# Patient Record
Sex: FEMALE | Race: WHITE | URBAN - METROPOLITAN AREA
[De-identification: names, ages, dates, MRNs, and addresses within clinical notes are randomized per-mention and may not be internally consistent; named-entity substitution may affect disease eponyms.]

---

## 2017-01-04 ENCOUNTER — TELEPHONE (OUTPATIENT)
Dept: OBGYN | Facility: CLINIC | Age: 67
End: 2017-01-04

## 2017-01-04 NOTE — TELEPHONE ENCOUNTER
Panel Management Review      Patient has the following on her problem list: None      Composite cancer screening  Chart review shows that this patient is due/due soon for the following Mammogram  Summary:    Patient is due/failing the following:   MAMMOGRAM    Action needed:   mammogram    Type of outreach:    Sent letter.    Questions for provider review:    None                                                                                                                                    Katrin Hooper

## 2017-01-04 NOTE — Clinical Note
[facility]  [address] [place] [address]  [phone]    January 4, 2017    Jeffrey Newman  [address] [place] [address]          Dear Jeffrey,    This letter is to remind you that you are due for your mammogram.    Please call [phone] to schedule your appointment at your earliest convenience.     If you have transferred your care elsewhere, please contact our office and we would be happy to forward your records. If you have any financial concerns regarding this appointment, please call so that we can discuss this further.      Sincerely,    Caesar Malik MD/tk

## 2017-02-16 ENCOUNTER — MEDICAL CORRESPONDENCE (OUTPATIENT)
Dept: HEALTH INFORMATION MANAGEMENT | Facility: CLINIC | Age: 67
End: 2017-02-16

## 2017-02-21 ENCOUNTER — OFFICE VISIT (OUTPATIENT)
Dept: OBGYN | Facility: CLINIC | Age: 67
End: 2017-02-21
Payer: MEDICARE

## 2017-02-21 VITALS
SYSTOLIC BLOOD PRESSURE: 117 MMHG | BODY MASS INDEX: 35.05 KG/M2 | DIASTOLIC BLOOD PRESSURE: 77 MMHG | WEIGHT: 197.8 LBS | HEART RATE: 61 BPM | HEIGHT: 63 IN

## 2017-02-21 DIAGNOSIS — Z00.00 ROUTINE GENERAL MEDICAL EXAMINATION AT A HEALTH CARE FACILITY: Primary | ICD-10-CM

## 2017-02-21 DIAGNOSIS — G47.00 INSOMNIA, UNSPECIFIED TYPE: ICD-10-CM

## 2017-02-21 DIAGNOSIS — Z78.0 MENOPAUSE: ICD-10-CM

## 2017-02-21 DIAGNOSIS — Z98.890 S/P LEEP OF CERVIX: ICD-10-CM

## 2017-02-21 DIAGNOSIS — E01.0 THYROMEGALY: ICD-10-CM

## 2017-02-21 DIAGNOSIS — M85.80 OSTEOPENIA: ICD-10-CM

## 2017-02-21 LAB
CALCIUM SERPL-MCNC: 8.8 MG/DL (ref 8.5–10.1)
CHOLEST SERPL-MCNC: 214 MG/DL
DEPRECATED CALCIDIOL+CALCIFEROL SERPL-MC: 26 UG/L (ref 20–75)
GLUCOSE SERPL-MCNC: 93 MG/DL (ref 70–99)
HDLC SERPL-MCNC: 61 MG/DL
LDLC SERPL CALC-MCNC: 131 MG/DL
NONHDLC SERPL-MCNC: 153 MG/DL
PTH-INTACT SERPL-MCNC: 114 PG/ML (ref 12–72)
T4 FREE SERPL-MCNC: 0.85 NG/DL (ref 0.76–1.46)
TRIGL SERPL-MCNC: 109 MG/DL
TSH SERPL DL<=0.005 MIU/L-ACNC: 4.47 MU/L (ref 0.4–4)

## 2017-02-21 PROCEDURE — G0101 CA SCREEN;PELVIC/BREAST EXAM: HCPCS | Performed by: OBSTETRICS & GYNECOLOGY

## 2017-02-21 PROCEDURE — 99397 PER PM REEVAL EST PAT 65+ YR: CPT | Performed by: OBSTETRICS & GYNECOLOGY

## 2017-02-21 PROCEDURE — 99213 OFFICE O/P EST LOW 20 MIN: CPT | Mod: 25 | Performed by: OBSTETRICS & GYNECOLOGY

## 2017-02-21 PROCEDURE — 82947 ASSAY GLUCOSE BLOOD QUANT: CPT | Performed by: OBSTETRICS & GYNECOLOGY

## 2017-02-21 PROCEDURE — 88175 CYTOPATH C/V AUTO FLUID REDO: CPT | Performed by: OBSTETRICS & GYNECOLOGY

## 2017-02-21 PROCEDURE — 84439 ASSAY OF FREE THYROXINE: CPT | Performed by: OBSTETRICS & GYNECOLOGY

## 2017-02-21 PROCEDURE — 82306 VITAMIN D 25 HYDROXY: CPT | Performed by: OBSTETRICS & GYNECOLOGY

## 2017-02-21 PROCEDURE — 84443 ASSAY THYROID STIM HORMONE: CPT | Performed by: OBSTETRICS & GYNECOLOGY

## 2017-02-21 PROCEDURE — 80061 LIPID PANEL: CPT | Performed by: OBSTETRICS & GYNECOLOGY

## 2017-02-21 PROCEDURE — 82310 ASSAY OF CALCIUM: CPT | Performed by: OBSTETRICS & GYNECOLOGY

## 2017-02-21 PROCEDURE — 87624 HPV HI-RISK TYP POOLED RSLT: CPT | Performed by: OBSTETRICS & GYNECOLOGY

## 2017-02-21 PROCEDURE — 36415 COLL VENOUS BLD VENIPUNCTURE: CPT | Performed by: OBSTETRICS & GYNECOLOGY

## 2017-02-21 PROCEDURE — 83970 ASSAY OF PARATHORMONE: CPT | Performed by: OBSTETRICS & GYNECOLOGY

## 2017-02-21 PROCEDURE — G0476 HPV COMBO ASSAY CA SCREEN: HCPCS | Performed by: OBSTETRICS & GYNECOLOGY

## 2017-02-21 RX ORDER — CITALOPRAM HYDROBROMIDE 20 MG/1
20 TABLET ORAL DAILY
Qty: 90 TABLET | Refills: 3 | Status: SHIPPED | OUTPATIENT
Start: 2017-02-21

## 2017-02-21 RX ORDER — TRAZODONE HYDROCHLORIDE 50 MG/1
50 TABLET, FILM COATED ORAL
Qty: 90 TABLET | Refills: 3 | Status: SHIPPED | OUTPATIENT
Start: 2017-02-21
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
## 2017-02-21 NOTE — MR AVS SNAPSHOT
After Visit Summary   2/21/2017    Jeffrey Newman    MRN: [id number]           Patient Information     Date Of Birth          1950        Visit Information        Provider Department      2/21/2017 9:30 AM Caesar Malik MD [facility]        Today's Diagnoses     Routine general medical examination at a health care facility    -  1    Osteopenia        Menopause        S/P LEEP of cervix        Insomnia, unspecified type        Thyromegaly          Care Instructions      Preventive Health Recommendations  Female Ages 65 +    Yearly exam:     See your health care provider every year in order to  o Review health changes.   o Discuss preventive care.    o Review your medicines if your doctor has prescribed any.      You no longer need a yearly Pap test unless you've had an abnormal Pap test in the past 10 years. If you have vaginal symptoms, such as bleeding or discharge, be sure to talk with your provider about a Pap test.      Every 1 to 2 years, have a mammogram.  If you are over 69, talk with your health care provider about whether or not you want to continue having screening mammograms.      Every 10 years, have a colonoscopy. Or, have a yearly FIT test (stool test). These exams will check for colon cancer.       Have a cholesterol test every 5 years, or more often if your doctor advises it.       Have a diabetes test (fasting glucose) every three years. If you are at risk for diabetes, you should have this test more often.       At age 65, have a bone density scan (DEXA) to check for osteoporosis (brittle bone disease).    Shots:    Get a flu shot each year.    Get a tetanus shot every 10 years.    Talk to your doctor about your pneumonia vaccines. There are now two you should receive - Pneumovax (PPSV 23) and Prevnar (PCV 13).    Talk to your doctor about the shingles vaccine.    Talk to your doctor about the hepatitis B vaccine.    Nutrition:     Eat at least 5 servings of  fruits and vegetables each day.      Eat whole-grain bread, whole-wheat pasta and brown rice instead of white grains and rice.      Talk to your provider about Calcium and Vitamin D.     Lifestyle    Exercise at least 150 minutes a week (30 minutes a day, 5 days a week). This will help you control your weight and prevent disease.      Limit alcohol to one drink per day.      No smoking.       Wear sunscreen to prevent skin cancer.       See your dentist twice a year for an exam and cleaning.      See your eye doctor every 1 to 2 years to screen for conditions such as glaucoma, macular degeneration, cataracts, etc         Follow-ups after your visit        Who to contact     If you have questions or need follow up information about today's clinic visit or your schedule please contact [facility] directly at [phone].  Normal or non-critical lab and imaging results will be communicated to you by MyChart, letter or phone within 4 business days after the clinic has received the results. If you do not hear from us within 7 days, please contact the clinic through [employer] or phone. If you have a critical or abnormal lab result, we will notify you by phone as soon as possible.  Submit refill requests through [employer] or call your pharmacy and they will forward the refill request to us. Please allow 3 business days for your refill to be completed.          Additional Information About Your Visit        [employer] Information     [employer] gives you secure access to your electronic health record. If you see a primary care provider, you can also send messages to your care team and make appointments. If you have questions, please call your primary care clinic.  If you do not have a primary care provider, please call [phone] and they will assist you.        Care EveryWhere ID     This is your Care EveryWhere ID. This could be used by other organizations to access your [place] medical records  [id number]       "  Your Vitals Were     Pulse Height BMI (Body Mass Index)             61 5' 2.75\" (1.594 m) 35.32 kg/m2          Blood Pressure from Last 3 Encounters:   02/21/17 117/77   12/19/16 118/71   12/09/15 100/60    Weight from Last 3 Encounters:   02/21/17 197 lb 12.8 oz (89.7 kg)   12/09/15 177 lb (80.3 kg)   11/18/15 182 lb (82.6 kg)              We Performed the Following     Calcium     Glucose     HPV High Risk Types DNA Cervical     Lipid Profile     Pap imaged thin layer diagnostic with HPV (select HPV order below)     Parathyroid Hormone Intact     TSH with free T4 reflex     Vitamin D Deficiency          Today's Medication Changes          These changes are accurate as of: 2/21/17 11:11 AM.  If you have any questions, ask your nurse or doctor.               Stop taking these medicines if you haven't already. Please contact your care team if you have questions.     doxycycline Monohydrate 100 MG Caps   Stopped by:  Caesar Malik MD                Where to get your medicines      These medications were sent to [facility] [address] [address]     Phone:  [phone]     citalopram 20 MG tablet    traZODone 50 MG tablet                Primary Care Provider Office Phone # Fax #    Rose Marie Corcoran [phone]       [facility]    [address] [place] [address]        Thank you!     Thank you for choosing [facility]  for your care. Our goal is always to provide you with excellent care. Hearing back from our patients is one way we can continue to improve our services. Please take a few minutes to complete the written survey that you may receive in the mail after your visit with us. Thank you!             Your Updated Medication List - Protect others around you: Learn how to safely use, store and throw away your medicines at www.disposemymeds.org.        "   This list is accurate as of: 2/21/17 11:11 AM.  Always use your most recent med list.                   Brand Name Dispense Instructions for use    citalopram 20 MG tablet    celeXA    90 tablet    Take 1 tablet (20 mg) by mouth daily       glucosamine chondroitinoitin complex Caps      Take  by mouth.       mometasone 0.1 % cream    ELOCON    45 g    Apply to AA BID x 1 week then PRN       multivitamin per tablet     100 tablet    Take 1 tablet by mouth daily.       traZODone 50 MG tablet    DESYREL    90 tablet    Take 1 tablet (50 mg) by mouth nightly as needed for sleep

## 2017-02-21 NOTE — NURSING NOTE
"Initial /77 (BP Location: Right arm, Patient Position: Chair, Cuff Size: Adult Regular)  Pulse 61  Ht 5' 2.75\" (1.594 m)  Wt 197 lb 12.8 oz (89.7 kg)  BMI 35.32 kg/m2 Estimated body mass index is 35.32 kg/(m^2) as calculated from the following:    Height as of this encounter: 5' 2.75\" (1.594 m).    Weight as of this encounter: 197 lb 12.8 oz (89.7 kg). .      "

## 2017-02-22 ASSESSMENT — PATIENT HEALTH QUESTIONNAIRE - PHQ9: SUM OF ALL RESPONSES TO PHQ QUESTIONS 1-9: 0

## 2017-02-22 NOTE — PROGRESS NOTES
Jeffrey   i forwarded your parathyroid , calcium and Vitamin D levels to your back specialist  Caesar Malik

## 2017-02-24 LAB
COPATH REPORT: ABNORMAL
PAP: ABNORMAL

## 2017-02-27 LAB
FINAL DIAGNOSIS: ABNORMAL
HPV HR 12 DNA CVX QL NAA+PROBE: POSITIVE
HPV16 DNA SPEC QL NAA+PROBE: NEGATIVE
HPV18 DNA SPEC QL NAA+PROBE: NEGATIVE
SPECIMEN DESCRIPTION: ABNORMAL

## 2017-04-06 ENCOUNTER — OFFICE VISIT (OUTPATIENT)
Dept: OBGYN | Facility: CLINIC | Age: 67
End: 2017-04-06
Payer: MEDICARE

## 2017-04-06 VITALS
WEIGHT: 204.4 LBS | HEIGHT: 63 IN | HEART RATE: 78 BPM | DIASTOLIC BLOOD PRESSURE: 65 MMHG | BODY MASS INDEX: 36.21 KG/M2 | SYSTOLIC BLOOD PRESSURE: 97 MMHG

## 2017-04-06 DIAGNOSIS — Z98.890 S/P LEEP OF CERVIX: ICD-10-CM

## 2017-04-06 DIAGNOSIS — R87.612 PAPANICOLAOU SMEAR OF CERVIX WITH LOW GRADE SQUAMOUS INTRAEPITHELIAL LESION (LGSIL): Primary | ICD-10-CM

## 2017-04-06 PROCEDURE — 88305 TISSUE EXAM BY PATHOLOGIST: CPT | Performed by: OBSTETRICS & GYNECOLOGY

## 2017-04-06 PROCEDURE — 88305 TISSUE EXAM BY PATHOLOGIST: CPT | Mod: 26 | Performed by: OBSTETRICS & GYNECOLOGY

## 2017-04-06 PROCEDURE — 57456 ENDOCERV CURETTAGE W/SCOPE: CPT | Performed by: OBSTETRICS & GYNECOLOGY

## 2017-04-06 NOTE — MR AVS SNAPSHOT
"              After Visit Summary   4/6/2017    Jeffrey Newman    MRN: [id number]           Patient Information     Date Of Birth          1950        Visit Information        Provider Department      4/6/2017 2:30 PM Caesar Malik MD; [facility]        Today's Diagnoses     Papanicolaou smear of cervix with low grade squamous intraepithelial lesion (LGSIL)    -  1    S/P LEEP of cervix           Follow-ups after your visit        Follow-up notes from your care team     Return in about 1 year (around 4/6/2018).      Who to contact     If you have questions or need follow up information about today's clinic visit or your schedule please contact [facility] directly at [phone].  Normal or non-critical lab and imaging results will be communicated to you by MyChart, letter or phone within 4 business days after the clinic has received the results. If you do not hear from us within 7 days, please contact the clinic through MyChart or phone. If you have a critical or abnormal lab result, we will notify you by phone as soon as possible.  Submit refill requests through [facility] or call your pharmacy and they will forward the refill request to us. Please allow 3 business days for your refill to be completed.          Additional Information About Your Visit        MyChart Information     [facility] gives you secure access to your electronic health record. If you see a primary care provider, you can also send messages to your care team and make appointments. If you have questions, please call your primary care clinic.  If you do not have a primary care provider, please call [phone] and they will assist you.        Care EveryWhere ID     This is your Care EveryWhere ID. This could be used by other organizations to access your [place] medical records  [id number]        Your Vitals Were     Pulse Height BMI (Body Mass Index)             78 5' 2.75\" (1.594 m) 36.5 " kg/m2          Blood Pressure from Last 3 Encounters:   04/06/17 97/65   02/21/17 117/77   12/19/16 118/71    Weight from Last 3 Encounters:   04/06/17 204 lb 6.4 oz (92.7 kg)   02/21/17 197 lb 12.8 oz (89.7 kg)   12/09/15 177 lb (80.3 kg)              We Performed the Following     COLP CERVIX/UPPER VAGINA W BX CERVIX/ENDOCERV CURETT     Surgical pathology exam        Primary Care Provider Office Phone # Fax #    Rose Marie Corcoran [phone]       [facility]    [address] [place] [address]        Thank you!     Thank you for choosing [facility]  for your care. Our goal is always to provide you with excellent care. Hearing back from our patients is one way we can continue to improve our services. Please take a few minutes to complete the written survey that you may receive in the mail after your visit with us. Thank you!             Your Updated Medication List - Protect others around you: Learn how to safely use, store and throw away your medicines at www.disposemymeds.org.          This list is accurate as of: 4/6/17  3:18 PM.  Always use your most recent med list.                   Brand Name Dispense Instructions for use    citalopram 20 MG tablet    celeXA    90 tablet    Take 1 tablet (20 mg) by mouth daily       glucosamine chondroitinoitin complex Caps      Take  by mouth.       mometasone 0.1 % cream    ELOCON    45 g    Apply to AA BID x 1 week then PRN       multivitamin per tablet     100 tablet    Take 1 tablet by mouth daily.       traZODone 50 MG tablet    DESYREL    90 tablet    Take 1 tablet (50 mg) by mouth nightly as needed for sleep

## 2017-04-06 NOTE — PROGRESS NOTES
Jeffrey Newman is a 65 year old female who presents for repeat colposcopy, referred by Pap nurses. Pap smear 1 months ago showed: LSIL with HR HPV non-16/18 The prior pap showed ASC-US with positive for HPV 18 and others.   She has had previous colposcopy back in  and . She's never had a higher grade lesion than that of of low grade NEDRA       Past Medical History          Past Medical History   Diagnosis Date     Cervical high risk HPV (human papillomavirus) test positive  &        Types 18, 31 & 45 (all high risk)     History of colposcopy with cervical biopsy 10/31/11       benign     Lyme disease 7/3/2007     LSIL (low grade squamous intraepithelial lesion) on Pap smear 2013     ASCUS favor benign 2013, 2014       neg high risk HPV     High risk HPV infection 11/18/15       normal pap          Family History           Family History   Problem Relation Age of Onset     Asthma No family hx of       C.A.D. No family hx of       Diabetes No family hx of       Hypertension No family hx of       Cerebrovascular Accident No family hx of       Cancer - colorectal No family hx of       Prostate Cancer No family hx of       Cancer Father         pancreatic ca     Breast Cancer Sister         breast ca - age 40,  age 50            Previous history of abnormal paps?: Yes   History of LEEP?: : Yes   History of veneral diseases: : No  Do you desire testing for any of these diseases? : No  History of genital warts: No  Visible warts now?: No  Previously treated? If so, how?: No     No LMP recorded. Patient is postmenopausal.  Type of contraception: post menopausal status  Not SA         History   Smoking status     Never Smoker    Smokeless tobacco     Never Used      History of sexual abuse: No        Allergies as of 2015 - reviewed 2015   Allergen Reaction Noted     Ciprofloxacin Rash 06/15/2009     Flagyl [imidazole antifungals] Nausea and Vomiting and Rash 06/15/2009          PROCEDURE:  Before the procedure, it was ensured that the patient was educated regarding the nature of her findings to date, the implications of them, and what was to be done. She has been made aware of the role of HPV, the natural history of infection, ways to minimize her future risk, the effect of HPV on the cervix, and treatment options available should they be indicated. The   pathophysiology of the cervix, including a discussion of squamous vs. endometrial cells, and squamous metaplasia have all been reviewed, using illustrations and sketches. The details of the colposcopic procedure were reviewed, as well as the risks of missed diagnoses, pain, infection and bleeding. All questions were answered before proceeding, and informed consent was therefore obtained.     Bimanual examination: was performed and was unremarkable.  Unenhanced examination of the cervix was normal without lesions. Pap smear and endocervical sampling not obtained due to: not due  Please refer to images section for details!  Pap repeated?: No  SCJ seen?: yes Endocervical speculum needed?: No  ECC done?: Yes   Lugol's solution used?: Yes no change noted  Satisfactory examination?: yes     Vaginal vault: normal to cursory inspection yes  Urethra normal?: yes  Labia normal?: yes  Perineum normal?: yes  Rectum normal?: yes     FINDINGS:  Please see image   Cervix: no visible lesions  Procedure: ECC     Procedure summary: Patient tolerated procedure well      Assessment: Normal exam without visible pathology        Plan: Specimens labelled and sent to pathology., treatment options discussed with patient and post biopsy instructions given to patient  Plan follow-up in one year  Caesar Malik

## 2017-04-06 NOTE — NURSING NOTE
"Initial BP 97/65 (BP Location: Right arm, Patient Position: Chair, Cuff Size: Adult Large)  Pulse 78  Ht 5' 2.75\" (1.594 m)  Wt 204 lb 6.4 oz (92.7 kg)  BMI 36.5 kg/m2 Estimated body mass index is 36.5 kg/(m^2) as calculated from the following:    Height as of this encounter: 5' 2.75\" (1.594 m).    Weight as of this encounter: 204 lb 6.4 oz (92.7 kg). .      "

## 2017-04-07 NOTE — PROGRESS NOTES
Miller Malik,  Are we still awaiting pathology results? I see one in process from yest. Please advise.  Kavita Van  Pap Tracking RN

## 2017-04-10 LAB — COPATH REPORT: NORMAL

## 2017-04-17 NOTE — PROGRESS NOTES
Pt notified of colp result and f/u plan. The patient verbalizes understanding and agrees with the plan.   Tracking updated.   Kell Lamas  Pap Tracking RN

## 2017-07-19 ENCOUNTER — TELEPHONE (OUTPATIENT)
Dept: OBGYN | Facility: CLINIC | Age: 67
End: 2017-07-19

## 2017-07-19 NOTE — TELEPHONE ENCOUNTER
Panel Management Review      Patient has the following on her problem list: None      Composite cancer screening  Chart review shows that this patient is due/due soon for the following Mammogram  Summary:    Patient is due/failing the following:   MAMMOGRAM    Action needed:   Patient needs office visit for mammogram.    Type of outreach:    Sent [employer] message.    Questions for provider review:    None                                                                                                                                    JERONIMO MORENO MA       Chart routed to none .

## 2017-10-31 ENCOUNTER — TELEPHONE (OUTPATIENT)
Dept: OBGYN | Facility: CLINIC | Age: 67
End: 2017-10-31

## 2017-10-31 NOTE — TELEPHONE ENCOUNTER
Please abstract the following data from this visit with this patient into the appropriate field in Epic:    Mammogram done on this date: 11/11/16 (approximately), by this group: [facility], results were   There is no radiographic evidence for malignancy.  Recommend   annual mammograms.    A lay language report of this examination will be provided to the patient.       MAMMOGRAM ASSESSMENT:  ACR 2 Benign   Result Narrative   XR MAMMO BILAT SCREEN FFDM [.0]    CLINICAL HISTORY:  This is an asymptomatic 66 y.o. patient.    INDICATION FOR EXAM: Mammogram Screening.    TECHNIQUE: CC & MLO views were obtained.  This digital study was evaluated   with the assistance of Computer-Aided Detection.       COMPARISON FILMS: Yes  11/9/15 [facility]  10/29/14 [facility]    FINDINGS:  Mammographically, the breast tissue is heterogeneously dense,   which could obscure detection of small masses.  No suspicious masses or   microcalcifications.  Benign appearing calcifications within both breasts   and Post treatment changes within left breast.      .

## 2018-01-19 ENCOUNTER — TELEPHONE (OUTPATIENT)
Dept: OBGYN | Facility: CLINIC | Age: 68
End: 2018-01-19

## 2018-01-19 NOTE — TELEPHONE ENCOUNTER
Panel Management Review        Composite cancer screening  Chart review shows that this patient is due/due soon for the following Mammogram  Summary:    Patient is due/failing the following:   MAMMOGRAM    Action needed:   Patient needs office visit for mammogram.    Type of outreach:    Sent [employer] message. and Sent letter.    Questions for provider review:    None                                                                                                                                    JERONIMO MORENO MA       Chart routed to none .

## 2018-01-19 NOTE — LETTER
January 19, 2018      Jeffrey Newman  [address] [facility] [address]    Dear MsAnthonyPaty,      This letter is to remind you that you are due for your follow up mammogram.    Please call [phone] to schedule your appointment at your earliest convenience.     If you have completed the tests outside of [place], please have the results forwarded to our office. We will update the chart for your primary Physician to review before your next annual physical.     Sincerely,      Caesar Malik MD

## 2018-04-22 ENCOUNTER — HEALTH MAINTENANCE LETTER (OUTPATIENT)
Age: 68
End: 2018-04-22

## 2018-04-30 ENCOUNTER — TELEPHONE (OUTPATIENT)
Dept: OBGYN | Facility: CLINIC | Age: 68
End: 2018-04-30

## 2018-04-30 NOTE — TELEPHONE ENCOUNTER
Panel Management Review      Health Maintenance List    Health Maintenance   Topic Date Due     HEPATITIS C SCREENING  [birth year]19/1968     ADVANCE DIRECTIVE PLANNING Q5 YRS  03/19/2005     FALL RISK ASSESSMENT  03/19/2015     PNEUMOCOCCAL (2 of 2 - PCV13) 11/18/2016     PHQ-9 Q6 MONTHS  08/21/2017     INFLUENZA VACCINE (1) 09/01/2017     HPV Q1 Year  02/21/2018     PAP Q1 YR DIAGNOSTIC  04/06/2018     MAMMO SCREEN Q2 YR (SYSTEM ASSIGNED)  05/13/2018     COLON CANCER SCREEN (SYSTEM ASSIGNED)  02/26/2020     TETANUS IMMUNIZATION (SYSTEM ASSIGNED)  01/11/2021     LIPID SCREEN Q5 YR FEMALE (SYSTEM ASSIGNED)  02/21/2022     DEXA SCAN SCREENING (SYSTEM ASSIGNED)  Completed       Composite cancer screening  Chart review shows that this patient is due/due soon for the following Mammogram  Lab Results   Component Value Date    PAP LSIL 02/21/2017     Past Surgical History:   Procedure Laterality Date     LEEP TX, CERVICAL  2/18/13    ESSIE 1     OTHER SURGICAL HISTORY      breast lumpectomy with lymph node disection     SURGICAL HISTORY OF -       tubal and tubal reversal     TONSILLECTOMY         Is hysterectomy listed in surgical history? No   Is mastectomy listed in surgical history? No     Summary:    Patient is due/failing the following:   Mammogram    Action needed: Patient needs office visit for mammogram.    Type of outreach:  Sent [employer] message.      Staff Signature:  JERONIMO MORENO MA

## 2018-04-30 NOTE — LETTER
April 30, 2018      Jeffrey Newman  [address] [facility] [address]    Dear ,      This letter is to remind you that you are due for your mammogram.    Please call [phone] to schedule your appointment at your earliest convenience.     If you have completed the tests outside of [place], please have the results forwarded to our office. We will update the chart for your primary Physician to review before your next annual physical.     Sincerely,      Caesar Malik MD

## 2018-05-06 ENCOUNTER — HEALTH MAINTENANCE LETTER (OUTPATIENT)
Age: 68
End: 2018-05-06

## 2018-10-24 ENCOUNTER — TELEPHONE (OUTPATIENT)
Dept: OBGYN | Facility: CLINIC | Age: 68
End: 2018-10-24

## 2018-10-24 NOTE — TELEPHONE ENCOUNTER
Pt is past due for f/u pap smear.  Reminder letter was sent 03/25/18.  Spoke with pt, has moved to [place] and transferred care. [facility].  Sveta Douglas,    Pap Tracking

## 2019-10-05 ENCOUNTER — HEALTH MAINTENANCE LETTER (OUTPATIENT)
Age: 69
End: 2019-10-05

## 2020-02-10 ENCOUNTER — HEALTH MAINTENANCE LETTER (OUTPATIENT)
Age: 70
End: 2020-02-10

## 2020-11-14 ENCOUNTER — HEALTH MAINTENANCE LETTER (OUTPATIENT)
Age: 70
End: 2020-11-14

## 2021-04-03 ENCOUNTER — HEALTH MAINTENANCE LETTER (OUTPATIENT)
Age: 71
End: 2021-04-03

## 2021-09-12 ENCOUNTER — HEALTH MAINTENANCE LETTER (OUTPATIENT)
Age: 71
End: 2021-09-12

## 2022-04-24 ENCOUNTER — HEALTH MAINTENANCE LETTER (OUTPATIENT)
Age: 72
End: 2022-04-24

## 2022-11-19 ENCOUNTER — HEALTH MAINTENANCE LETTER (OUTPATIENT)
Age: 72
End: 2022-11-19

## 2023-06-01 ENCOUNTER — HEALTH MAINTENANCE LETTER (OUTPATIENT)
Age: 73
End: 2023-06-01